# Patient Record
Sex: FEMALE | Race: WHITE | Employment: STUDENT | URBAN - METROPOLITAN AREA
[De-identification: names, ages, dates, MRNs, and addresses within clinical notes are randomized per-mention and may not be internally consistent; named-entity substitution may affect disease eponyms.]

---
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
## 2020-11-06 ENCOUNTER — OFFICE VISIT (OUTPATIENT)
Dept: FAMILY MEDICINE CLINIC | Facility: CLINIC | Age: 20
End: 2020-11-06
Payer: COMMERCIAL

## 2020-11-06 VITALS — HEART RATE: 101 BPM | OXYGEN SATURATION: 98 % | TEMPERATURE: 98 F

## 2020-11-06 DIAGNOSIS — Z11.59 SCREENING FOR VIRAL DISEASE: ICD-10-CM

## 2020-11-06 DIAGNOSIS — R05.9 COUGH: Primary | ICD-10-CM

## 2020-11-06 PROCEDURE — 99202 OFFICE O/P NEW SF 15 MIN: CPT | Performed by: FAMILY MEDICINE

## 2020-11-06 PROCEDURE — 99072 ADDL SUPL MATRL&STAF TM PHE: CPT | Performed by: FAMILY MEDICINE

## 2020-11-06 NOTE — PROGRESS NOTES
CHIEF COMPLAINT:   Patient presents with:  Cough: cough, congestion. wants covid testing. HPI:   Liz Shaikh is a 21year old female presents to clinic with complaints of cough, nasal congestion, fever, body aches.  Patient has had symptoms fo is non labored. CARDIO: RRR without murmur  EXTREMITIES: no cyanosis, clubbing or edema  LYMPH: no anterior cervical lymphadenopathy, no submandibular lymphadenopathy. No  posterior cervical or occipital lymphadenopathy.     No results found for this or a

## 2021-01-29 ENCOUNTER — TELEMEDICINE (OUTPATIENT)
Dept: TELEHEALTH | Age: 21
End: 2021-01-29

## 2021-01-29 ENCOUNTER — TELEPHONE (OUTPATIENT)
Dept: TELEHEALTH | Age: 21
End: 2021-01-29

## 2021-01-29 DIAGNOSIS — R39.9 UTI SYMPTOMS: Primary | ICD-10-CM

## 2021-01-29 DIAGNOSIS — Z02.9 ENCOUNTER FOR ADMINISTRATIVE EXAMINATIONS, UNSPECIFIED: Primary | ICD-10-CM

## 2021-01-29 PROCEDURE — 99212 OFFICE O/P EST SF 10 MIN: CPT | Performed by: NURSE PRACTITIONER

## 2021-01-29 RX ORDER — CEPHALEXIN 500 MG/1
500 CAPSULE ORAL 2 TIMES DAILY
Qty: 14 CAPSULE | Refills: 0 | Status: SHIPPED | OUTPATIENT
Start: 2021-01-29 | End: 2021-02-05

## 2021-01-29 NOTE — TELEPHONE ENCOUNTER
Virtual Telephone Check-In    Marc Baxter verbally consents to a Virtual/Telephone Check-In visit on 01/29/21. Patient has been referred to the [facility] website at [email or web address] to review the yearly Consent to Treat document.     Patient unders

## 2021-01-29 NOTE — PATIENT INSTRUCTIONS
· Complete full course of antibiotics. · We will call you in 2-3 days with the results of your urine culture.   · Over the counter Tylenol (acetaminophen) or Advil/Motrin (ibuprofen) can be taken according to package instructions as needed for pain/disco The terms bladder infection, UTI, and cystitis are often used to describe the same thing. But they are not always the same. Cystitis is an inflammation of the bladder. The most common cause of cystitis is an infection.   Symptoms  The infection causes infla · Take antibiotics until they are used up, even if you feel better. It's important to finish them to make sure the infection has cleared. · You can use acetaminophen or ibuprofen for pain, fever, or discomfort, unless another medicine was prescribed.  If y If a culture was done, you will be told if your treatment needs to be changed. If directed, you can call to find out the results. If X-rays were done, you will be told if the results will affect your treatment.   Call 911  Call 911 if any of the following What side effects may I notice from receiving this medicine?   Side effects that you should report to your doctor or health care professional as soon as possible:  · allergic reactions like skin rash, itching or hives, swelling of the face, lips, or tongue This medicine may cause serious skin reactions. They can happen weeks to months after starting the medicine. Contact your health care provider right away if you notice fevers or flu-like symptoms with a rash.  The rash may be red or purple and then turn int

## 2021-04-02 ENCOUNTER — OFFICE VISIT (OUTPATIENT)
Dept: INTERNAL MEDICINE CLINIC | Facility: CLINIC | Age: 21
End: 2021-04-02
Payer: COMMERCIAL

## 2021-04-02 VITALS
BODY MASS INDEX: 18.18 KG/M2 | RESPIRATION RATE: 14 BRPM | SYSTOLIC BLOOD PRESSURE: 98 MMHG | OXYGEN SATURATION: 97 % | TEMPERATURE: 98 F | WEIGHT: 107.81 LBS | HEIGHT: 64.75 IN | DIASTOLIC BLOOD PRESSURE: 66 MMHG | HEART RATE: 81 BPM

## 2021-04-02 DIAGNOSIS — N30.00 ACUTE CYSTITIS WITHOUT HEMATURIA: Primary | ICD-10-CM

## 2021-04-02 DIAGNOSIS — E55.9 VITAMIN D DEFICIENCY: ICD-10-CM

## 2021-04-02 DIAGNOSIS — Z00.00 PHYSICAL EXAM: ICD-10-CM

## 2021-04-02 DIAGNOSIS — Z13.29 SCREENING FOR THYROID DISORDER: ICD-10-CM

## 2021-04-02 DIAGNOSIS — Z72.0 TOBACCO ABUSE: ICD-10-CM

## 2021-04-02 DIAGNOSIS — D64.9 NORMOCYTIC ANEMIA: ICD-10-CM

## 2021-04-02 PROCEDURE — 3078F DIAST BP <80 MM HG: CPT | Performed by: NURSE PRACTITIONER

## 2021-04-02 PROCEDURE — 81003 URINALYSIS AUTO W/O SCOPE: CPT | Performed by: NURSE PRACTITIONER

## 2021-04-02 PROCEDURE — 99214 OFFICE O/P EST MOD 30 MIN: CPT | Performed by: NURSE PRACTITIONER

## 2021-04-02 PROCEDURE — 87086 URINE CULTURE/COLONY COUNT: CPT | Performed by: NURSE PRACTITIONER

## 2021-04-02 PROCEDURE — 3008F BODY MASS INDEX DOCD: CPT | Performed by: NURSE PRACTITIONER

## 2021-04-02 PROCEDURE — 3074F SYST BP LT 130 MM HG: CPT | Performed by: NURSE PRACTITIONER

## 2021-04-02 RX ORDER — SULFAMETHOXAZOLE AND TRIMETHOPRIM 800; 160 MG/1; MG/1
1 TABLET ORAL 2 TIMES DAILY
Qty: 14 TABLET | Refills: 0 | Status: SHIPPED | OUTPATIENT
Start: 2021-04-02 | End: 2021-04-09

## 2021-04-02 RX ORDER — CHOLECALCIFEROL (VITAMIN D3) 125 MCG
1 CAPSULE ORAL DAILY
COMMUNITY

## 2021-04-02 NOTE — PATIENT INSTRUCTIONS
Take antibiotic completely as ordered     Take antibiotic with food    Eat yogurt twice a day while on antibiotic or take a probiotic    Monitor for diarrhea and side effect.  Monitor for allergies    Follow up in one week for your annual physical and UTI s

## 2021-04-02 NOTE — PROGRESS NOTES
[facility]    CHIEF COMPLAINT:  Patient presents with:  Urinary Symptoms: Frequent urination, burning, back pn and feeling tired.   Started 3/29/21        HISTORY OF PRESENT ILLNESS:  The patient is a 21year old year old female new to this office ([address]     pt taking keppra        Past Surgical History:   Past Surgical History:   Procedure Laterality Date   • OTHER SURGICAL HISTORY      adnoids out 2007       Current Medications:    Current Outpatient Medications   Medication Sig Dispense Refill   • Transportation (Non-Medical):   Physical Activity:       Days of Exercise per Week:       Minutes of Exercise per Session:   Stress:       Feeling of Stress :   Social Connections:       Frequency of Communication with Friends and Family:       Frequency o Falls in the last 12 months: Yes    FUNCTIONAL ASSESSMENT (Able to perform all ADLs):  Yes    BODY HABITUS AND NUTRITION STATUS:  Body mass index is 18.08 kg/m².     DEPRESSION ASSESSMENT:  No    PHYSICAL EXAM:     BP 98/66 (BP Location: Right arm, Patien culture ordered. - due to hx of seizures pcn and cipro avoided. Bactrim ordered. Has tolerated it in the past. Will await sensitivity.    - see pt instructions for abt edu  - SPECIMEN HANDLING,DR OFF->LAB  - URINALYSIS, ROUTINE; Future  - URINE CULTURE, RO

## 2021-04-09 ENCOUNTER — OFFICE VISIT (OUTPATIENT)
Dept: INTERNAL MEDICINE CLINIC | Facility: CLINIC | Age: 21
End: 2021-04-09
Payer: COMMERCIAL

## 2021-04-09 VITALS
OXYGEN SATURATION: 98 % | TEMPERATURE: 98 F | HEIGHT: 65.75 IN | HEART RATE: 89 BPM | SYSTOLIC BLOOD PRESSURE: 96 MMHG | DIASTOLIC BLOOD PRESSURE: 72 MMHG | RESPIRATION RATE: 14 BRPM | WEIGHT: 107.19 LBS | BODY MASS INDEX: 17.43 KG/M2

## 2021-04-09 DIAGNOSIS — E55.9 VITAMIN D DEFICIENCY: ICD-10-CM

## 2021-04-09 DIAGNOSIS — Z72.0 TOBACCO ABUSE: ICD-10-CM

## 2021-04-09 DIAGNOSIS — D64.9 NORMOCYTIC ANEMIA: ICD-10-CM

## 2021-04-09 DIAGNOSIS — Z00.00 ENCOUNTER FOR ANNUAL HEALTH EXAMINATION: Primary | ICD-10-CM

## 2021-04-09 PROCEDURE — 3074F SYST BP LT 130 MM HG: CPT | Performed by: NURSE PRACTITIONER

## 2021-04-09 PROCEDURE — 3078F DIAST BP <80 MM HG: CPT | Performed by: NURSE PRACTITIONER

## 2021-04-09 PROCEDURE — 3008F BODY MASS INDEX DOCD: CPT | Performed by: NURSE PRACTITIONER

## 2021-04-09 PROCEDURE — 90471 IMMUNIZATION ADMIN: CPT | Performed by: NURSE PRACTITIONER

## 2021-04-09 PROCEDURE — 90651 9VHPV VACCINE 2/3 DOSE IM: CPT | Performed by: NURSE PRACTITIONER

## 2021-04-09 PROCEDURE — 99395 PREV VISIT EST AGE 18-39: CPT | Performed by: NURSE PRACTITIONER

## 2021-04-09 NOTE — PROGRESS NOTES
CHIEF COMPLAINT   Complete physical    HPI:   Corinne Economy is a 21year old female who presents for a complete physical exam.     Wt Readings from Last 6 Encounters:  04/09/21 : 107 lb 3.2 oz (48.6 kg)  04/02/21 : 107 lb 12.8 oz (48.9 kg)  11/25/19 : 1 [facility])     pt taking keppra   • Vitamin D deficiency       Past Surgical History:   Procedure Laterality Date   • ADENOIDECTOMY Bilateral 2007   • BOTOX Bilateral 10/2020    Lip injections   • OTHER SURGICAL HISTORY      adnoids out 2007      Family History headaches  PSYCHE: no complaint of depression or anxiety  HEMATOLOGIC: denies hx of anemia    EXAM:   BP 96/72 (BP Location: Right arm, Patient Position: Sitting, Cuff Size: adult)   Temp 98.1 °F (36.7 °C) (Temporal)   Resp 14   Ht 5' 5.75\" (1.67 m)   Wt Pap and pelvic deferred to gyne per patient request. Reviewed diet and exercise. Pt' s weight is Body mass index is 17.43 kg/m². Oneal Claude Recommended regular exercise. Vaccines discussed. 2nd HPV vaccine given. Labs to be done. See gyne. Stop smoking.      2. Vit

## 2021-04-09 NOTE — PATIENT INSTRUCTIONS
Get your labs done when your have time. You should be fasting for at least 10 hours. If you take a multivitamin with Biotin or any biotin product it should be held for 3 days prior to getting your labs done.     Get your last HPV vaccine in 12 weeks    See

## 2021-05-11 ENCOUNTER — TELEPHONE (OUTPATIENT)
Dept: INTERNAL MEDICINE CLINIC | Facility: CLINIC | Age: 21
End: 2021-05-11

## 2021-05-11 NOTE — TELEPHONE ENCOUNTER
LM to call back to triage more  Notified phones are up until 4.45 pm and would like to find out more about her symptoms to assess if she can wait until her appt tomorrow or need to go to ER

## 2021-05-11 NOTE — TELEPHONE ENCOUNTER
Called and spoke with the patient regarding her mychart appt request for Mold sickness and shortness of breath. Stated she recently discovered mold in her house/condo. Stated she has been having intermittent shortness of breath for the past month and camryn

## 2021-05-13 ENCOUNTER — OFFICE VISIT (OUTPATIENT)
Dept: INTERNAL MEDICINE CLINIC | Facility: CLINIC | Age: 21
End: 2021-05-13
Payer: COMMERCIAL

## 2021-05-13 VITALS
RESPIRATION RATE: 16 BRPM | HEART RATE: 105 BPM | TEMPERATURE: 99 F | HEIGHT: 64.75 IN | OXYGEN SATURATION: 98 % | WEIGHT: 110.19 LBS | SYSTOLIC BLOOD PRESSURE: 112 MMHG | DIASTOLIC BLOOD PRESSURE: 84 MMHG | BODY MASS INDEX: 18.58 KG/M2

## 2021-05-13 DIAGNOSIS — Z13.21 ENCOUNTER FOR VITAMIN DEFICIENCY SCREENING: ICD-10-CM

## 2021-05-13 DIAGNOSIS — R06.02 SHORTNESS OF BREATH: Primary | ICD-10-CM

## 2021-05-13 DIAGNOSIS — D64.9 NORMOCYTIC ANEMIA: ICD-10-CM

## 2021-05-13 DIAGNOSIS — R53.83 FATIGUE, UNSPECIFIED TYPE: ICD-10-CM

## 2021-05-13 PROCEDURE — 3079F DIAST BP 80-89 MM HG: CPT | Performed by: NURSE PRACTITIONER

## 2021-05-13 PROCEDURE — 99214 OFFICE O/P EST MOD 30 MIN: CPT | Performed by: NURSE PRACTITIONER

## 2021-05-13 PROCEDURE — 3074F SYST BP LT 130 MM HG: CPT | Performed by: NURSE PRACTITIONER

## 2021-05-13 PROCEDURE — 3008F BODY MASS INDEX DOCD: CPT | Performed by: NURSE PRACTITIONER

## 2021-05-13 RX ORDER — INHALER, ASSIST DEVICES
SPACER (EA) MISCELLANEOUS
Qty: 1 DEVICE | Refills: 0 | Status: SHIPPED | OUTPATIENT
Start: 2021-05-13

## 2021-05-13 RX ORDER — ALBUTEROL SULFATE 90 UG/1
2 AEROSOL, METERED RESPIRATORY (INHALATION) EVERY 6 HOURS PRN
Qty: 1 INHALER | Refills: 0 | Status: SHIPPED | OUTPATIENT
Start: 2021-05-13

## 2021-05-13 NOTE — PATIENT INSTRUCTIONS
Get your labs completed. You should be holding biotin for 4 days prior to your labs if you do take it. Make an appointment at the outpatient lab at the hospital site. Take the albuterol inhaler as needed for wheezing and shortness of breath.   Monitor

## 2021-05-13 NOTE — PROGRESS NOTES
Bia Leary is a 21year old female. CHIEF COMPLAINT   Shortness of breath, fatigue    HPI:   In January there was a roof leak. The closet got wet. She noticed two weeks ago that there was mildew on clothes from the closet.       Shortness of breath f Location: Right arm, Patient Position: Sitting, Cuff Size: adult)   Pulse 105   Temp 99.3 °F (37.4 °C) (Temporal)   Resp 16   Ht 5' 4.75\" (1.645 m)   Wt 110 lb 3.2 oz (50 kg)   LMP 05/01/2021   SpO2 98%   BMI 18.48 kg/m²   Body mass index is 18.48 kg/m². Future  - METHACHOLINE/ARIDOL CHALLENGE; Future  - D-DIMER; Future  - SARS-COV-2 BY PCR ([facility]); Future    2. Fatigue, unspecified type  - has hx of anemia. Iron studies ordered to be checked. CBC ordered. Vitamin levels ordered.      3. Normocytic anemia

## 2021-05-14 ENCOUNTER — LAB ENCOUNTER (OUTPATIENT)
Dept: LAB | Age: 21
End: 2021-05-14
Attending: NURSE PRACTITIONER
Payer: COMMERCIAL

## 2021-05-14 DIAGNOSIS — R06.02 SHORTNESS OF BREATH: ICD-10-CM

## 2021-05-14 DIAGNOSIS — D64.9 NORMOCYTIC ANEMIA: ICD-10-CM

## 2021-05-14 DIAGNOSIS — Z13.21 ENCOUNTER FOR VITAMIN DEFICIENCY SCREENING: ICD-10-CM

## 2021-05-14 DIAGNOSIS — Z13.29 SCREENING FOR THYROID DISORDER: ICD-10-CM

## 2021-05-14 DIAGNOSIS — Z00.00 PHYSICAL EXAM: ICD-10-CM

## 2021-05-14 DIAGNOSIS — E55.9 VITAMIN D DEFICIENCY: ICD-10-CM

## 2021-05-14 PROCEDURE — 85379 FIBRIN DEGRADATION QUANT: CPT | Performed by: NURSE PRACTITIONER

## 2021-05-14 PROCEDURE — 82607 VITAMIN B-12: CPT | Performed by: NURSE PRACTITIONER

## 2021-05-14 PROCEDURE — 83550 IRON BINDING TEST: CPT | Performed by: NURSE PRACTITIONER

## 2021-05-14 PROCEDURE — 80050 GENERAL HEALTH PANEL: CPT | Performed by: NURSE PRACTITIONER

## 2021-05-14 PROCEDURE — 82306 VITAMIN D 25 HYDROXY: CPT | Performed by: NURSE PRACTITIONER

## 2021-05-14 PROCEDURE — 83540 ASSAY OF IRON: CPT | Performed by: NURSE PRACTITIONER

## 2021-05-14 PROCEDURE — 82728 ASSAY OF FERRITIN: CPT | Performed by: NURSE PRACTITIONER

## 2021-07-12 ENCOUNTER — TELEPHONE (OUTPATIENT)
Dept: INTERNAL MEDICINE CLINIC | Facility: CLINIC | Age: 21
End: 2021-07-12

## 2021-07-12 NOTE — TELEPHONE ENCOUNTER
Pt made video visit for poss UTI on my chart. Is this appropriate or does pt need to come in to the office.  Thank you

## 2021-07-12 NOTE — TELEPHONE ENCOUNTER
Spoke to pt  Recommended to come in for evaluation for UTI  Pt v/u  VV appt changed to OV, ok per [facility]

## 2022-01-28 ENCOUNTER — TELEMEDICINE (OUTPATIENT)
Dept: TELEHEALTH | Age: 22
End: 2022-01-28

## 2022-01-28 ENCOUNTER — HOSPITAL ENCOUNTER (OUTPATIENT)
Age: 22
Discharge: HOME | End: 2022-01-28
Payer: COMMERCIAL

## 2022-01-28 VITALS
BODY MASS INDEX: 17.49 KG/M2 | HEART RATE: 74 BPM | DIASTOLIC BLOOD PRESSURE: 82 MMHG | SYSTOLIC BLOOD PRESSURE: 142 MMHG | TEMPERATURE: 98 F | HEIGHT: 65 IN | WEIGHT: 105 LBS | RESPIRATION RATE: 18 BRPM | OXYGEN SATURATION: 100 %

## 2022-01-28 DIAGNOSIS — J35.8 TONSILLAR EXUDATE: ICD-10-CM

## 2022-01-28 DIAGNOSIS — Z02.9 ENCOUNTERS FOR ADMINISTRATIVE PURPOSES: Primary | ICD-10-CM

## 2022-01-28 DIAGNOSIS — J03.90 TONSILLITIS: Primary | ICD-10-CM

## 2022-01-28 LAB
POCT MONO: NEGATIVE
S PYO AG THROAT QL: NEGATIVE

## 2022-01-28 PROCEDURE — 87880 STREP A ASSAY W/OPTIC: CPT | Performed by: NURSE PRACTITIONER

## 2022-01-28 PROCEDURE — 86308 HETEROPHILE ANTIBODY SCREEN: CPT | Performed by: NURSE PRACTITIONER

## 2022-01-28 PROCEDURE — 99213 OFFICE O/P EST LOW 20 MIN: CPT | Performed by: NURSE PRACTITIONER

## 2022-01-28 RX ORDER — AMOXICILLIN AND CLAVULANATE POTASSIUM 875; 125 MG/1; MG/1
1 TABLET, FILM COATED ORAL 2 TIMES DAILY
Qty: 20 TABLET | Refills: 0 | Status: SHIPPED | OUTPATIENT
Start: 2022-01-28 | End: 2022-02-07

## 2022-01-28 NOTE — ED INITIAL ASSESSMENT (HPI)
Denies pain, just feels like back of throat/tonsils irritated after drinking coconut water few days ago.

## 2022-01-28 NOTE — ED PROVIDER NOTES
CHIEF COMPLAINT:   Patient presents with:  Sore Throat      HPI:   Shea Rivers is a 24year old female presents to clinic with symptoms of sore throat. Patient has had for 4 days. Symptoms have been persisting and worsening since onset.   Patient repor use: No       REVIEW OF SYSTEMS:   GENERAL HEALTH:  See HPI  SKIN: see HPI  HEENT: denies ear pain, See HPI  RESPIRATORY: denies shortness of breath, or wheezing  CARDIOVASCULAR: denies chest pain, palpitations   GI: denies abdominal pain, constipation and Sig: Take 1 tablet by mouth 2 (two) times daily for 10 days. Risks, benefits, complications and side effects of meds discussed with patient. OTC Tylenol/Motrin prn.    Push fluids- warm or cool liquids, whichever is soothing for patient  If treate

## 2022-01-28 NOTE — PROGRESS NOTES
Telehealth Verbal Consent   I conducted a telehealth visit with Kaila Bundy today, 01/28/22, which was completed using two-way, real-time interactive audio and video communication.  This has been done in good gayatri to provide continuity of care by mouth daily. Take 1/2 a tablet daily     • CRANBERRY OR Take 2 capsules by mouth daily.      • Albuterol Sulfate HFA (PROAIR HFA) 108 (90 Base) MCG/ACT Inhalation Aero Soln Inhale 2 puffs into the lungs every 6 (six) hours as needed for Wheezing or Short encounter diagnosis)    PLAN: Meds as below. See patient Instructions. -patient advised to go to the [address] [place] [address] for testing. Patient given address and information. Patient is agreeable to plan.      Meds & Refills for this Visit:  Requested Prescriptions      N

## 2023-06-13 ENCOUNTER — OFFICE VISIT (OUTPATIENT)
Facility: CLINIC | Age: 23
End: 2023-06-13
Payer: COMMERCIAL

## 2023-06-13 VITALS
SYSTOLIC BLOOD PRESSURE: 102 MMHG | WEIGHT: 115 LBS | DIASTOLIC BLOOD PRESSURE: 62 MMHG | HEART RATE: 67 BPM | BODY MASS INDEX: 19 KG/M2

## 2023-06-13 DIAGNOSIS — N94.3 PREMENSTRUAL SYNDROME: Primary | ICD-10-CM

## 2023-06-13 DIAGNOSIS — R45.4 IRRITABLE BEHAVIOR: ICD-10-CM

## 2023-06-13 DIAGNOSIS — Z86.39 H/O VITAMIN D DEFICIENCY: ICD-10-CM

## 2023-06-13 PROCEDURE — 3078F DIAST BP <80 MM HG: CPT

## 2023-06-13 PROCEDURE — 99213 OFFICE O/P EST LOW 20 MIN: CPT

## 2023-06-13 PROCEDURE — 3074F SYST BP LT 130 MM HG: CPT

## 2023-12-19 NOTE — ED INITIAL ASSESSMENT (HPI)
Patient requesting MyC answer be sent on to PCP. Please see patient's MyC.    Patient states that she has taken Bactrim before.   Patient also requesting an oral yeast treatment (patient states she has previously taken fluconazole), as she tends to get yeast infections with when taking antibiotics.  Patient is requesting a topical yeast treatment as well.  Patient is requesting a refill of Methylpredisonlone    Routing to PCP to review MyC and advise on patient requests.    Callback if needed: [phone]    Kathleen Spring RN  -[facility]   The patient states she woke up with a sore throat this morning and it has progressively gotten worse. Since around 1-2pm this afternoon she noticed it was difficult for her to swallow and she has been using a cup to spit into because she can't swallow.   Corina Valera

## 2024-04-26 ENCOUNTER — LAB ENCOUNTER (OUTPATIENT)
Dept: LAB | Age: 24
End: 2024-04-26
Attending: DERMATOLOGY
Payer: COMMERCIAL

## 2024-04-26 ENCOUNTER — OFFICE VISIT (OUTPATIENT)
Dept: OBGYN CLINIC | Facility: CLINIC | Age: 24
End: 2024-04-26
Payer: COMMERCIAL

## 2024-04-26 VITALS
HEIGHT: 65 IN | WEIGHT: 119.06 LBS | SYSTOLIC BLOOD PRESSURE: 124 MMHG | HEART RATE: 74 BPM | DIASTOLIC BLOOD PRESSURE: 80 MMHG | BODY MASS INDEX: 19.83 KG/M2

## 2024-04-26 DIAGNOSIS — Z86.39 H/O VITAMIN D DEFICIENCY: ICD-10-CM

## 2024-04-26 DIAGNOSIS — L63.9 ALOPECIA AREATA: ICD-10-CM

## 2024-04-26 DIAGNOSIS — R45.4 IRRITABLE BEHAVIOR: ICD-10-CM

## 2024-04-26 DIAGNOSIS — Z12.4 CERVICAL CANCER SCREENING: ICD-10-CM

## 2024-04-26 DIAGNOSIS — N94.3 PREMENSTRUAL SYNDROME: ICD-10-CM

## 2024-04-26 DIAGNOSIS — Z11.3 SCREENING EXAMINATION FOR STI: ICD-10-CM

## 2024-04-26 DIAGNOSIS — Z01.419 ENCOUNTER FOR WELL WOMAN EXAM WITH ROUTINE GYNECOLOGICAL EXAM: Primary | ICD-10-CM

## 2024-04-26 LAB
DEPRECATED HBV CORE AB SER IA-ACNC: 50 NG/ML
TSI SER-ACNC: 1.11 MIU/ML (ref 0.36–3.74)
VIT D+METAB SERPL-MCNC: 28.9 NG/ML (ref 30–100)

## 2024-04-26 PROCEDURE — 82728 ASSAY OF FERRITIN: CPT

## 2024-04-26 PROCEDURE — 87591 N.GONORRHOEAE DNA AMP PROB: CPT

## 2024-04-26 PROCEDURE — 82306 VITAMIN D 25 HYDROXY: CPT

## 2024-04-26 PROCEDURE — 83001 ASSAY OF GONADOTROPIN (FSH): CPT

## 2024-04-26 PROCEDURE — 3008F BODY MASS INDEX DOCD: CPT

## 2024-04-26 PROCEDURE — 83002 ASSAY OF GONADOTROPIN (LH): CPT

## 2024-04-26 PROCEDURE — 99395 PREV VISIT EST AGE 18-39: CPT

## 2024-04-26 PROCEDURE — 3074F SYST BP LT 130 MM HG: CPT

## 2024-04-26 PROCEDURE — 84443 ASSAY THYROID STIM HORMONE: CPT

## 2024-04-26 PROCEDURE — 87491 CHLMYD TRACH DNA AMP PROBE: CPT

## 2024-04-26 PROCEDURE — 3079F DIAST BP 80-89 MM HG: CPT

## 2024-04-26 PROCEDURE — 84146 ASSAY OF PROLACTIN: CPT

## 2024-04-26 RX ORDER — MULTIVIT-MIN/IRON/FOLIC ACID/K 18-600-40
CAPSULE ORAL
COMMUNITY

## 2024-04-26 RX ORDER — MULTIVITAMIN WITH IRON
50 TABLET ORAL DAILY
COMMUNITY

## 2024-04-27 LAB
FSH SERPL-ACNC: 6.4 MIU/ML
LH SERPL-ACNC: 18.8 MIU/ML
PROLACTIN SERPL-MCNC: 9 NG/ML

## 2024-04-28 DIAGNOSIS — E55.9 VITAMIN D INSUFFICIENCY: Primary | ICD-10-CM

## 2024-04-28 RX ORDER — ERGOCALCIFEROL 1.25 MG/1
50000 CAPSULE ORAL WEEKLY
Qty: 8 CAPSULE | Refills: 0 | Status: SHIPPED | OUTPATIENT
Start: 2024-04-28 | End: 2024-06-17

## 2024-04-29 LAB
C TRACH DNA SPEC QL NAA+PROBE: NEGATIVE
N GONORRHOEA DNA SPEC QL NAA+PROBE: NEGATIVE

## 2024-05-02 ENCOUNTER — OFFICE VISIT (OUTPATIENT)
Dept: INTERNAL MEDICINE CLINIC | Facility: CLINIC | Age: 24
End: 2024-05-02
Payer: COMMERCIAL

## 2024-05-02 VITALS
HEART RATE: 85 BPM | OXYGEN SATURATION: 98 % | TEMPERATURE: 98 F | RESPIRATION RATE: 17 BRPM | HEIGHT: 64.8 IN | WEIGHT: 115 LBS | DIASTOLIC BLOOD PRESSURE: 60 MMHG | SYSTOLIC BLOOD PRESSURE: 110 MMHG | BODY MASS INDEX: 19.16 KG/M2

## 2024-05-02 DIAGNOSIS — E55.9 VITAMIN D DEFICIENCY: ICD-10-CM

## 2024-05-02 DIAGNOSIS — Z13.0 SCREENING FOR DEFICIENCY ANEMIA: ICD-10-CM

## 2024-05-02 DIAGNOSIS — G40.009 BENIGN ROLANDIC EPILEPSY (HCC): ICD-10-CM

## 2024-05-02 DIAGNOSIS — Z98.890 H/O COSMETIC SURGERY: ICD-10-CM

## 2024-05-02 DIAGNOSIS — Z00.00 PHYSICAL EXAM, ANNUAL: Primary | ICD-10-CM

## 2024-05-02 DIAGNOSIS — Z13.228 SCREENING FOR ENDOCRINE, METABOLIC AND IMMUNITY DISORDER: ICD-10-CM

## 2024-05-02 DIAGNOSIS — Z86.2 HISTORY OF IRON DEFICIENCY ANEMIA: ICD-10-CM

## 2024-05-02 DIAGNOSIS — Z13.29 SCREENING FOR ENDOCRINE, METABOLIC AND IMMUNITY DISORDER: ICD-10-CM

## 2024-05-02 DIAGNOSIS — L63.9 ALOPECIA AREATA: ICD-10-CM

## 2024-05-02 DIAGNOSIS — Z13.0 SCREENING FOR ENDOCRINE, METABOLIC AND IMMUNITY DISORDER: ICD-10-CM

## 2024-05-02 DIAGNOSIS — Z72.0 TOBACCO ABUSE: ICD-10-CM

## 2024-05-02 DIAGNOSIS — Z87.891 FORMER SMOKER: ICD-10-CM

## 2024-05-02 DIAGNOSIS — Z13.220 SCREENING FOR LIPID DISORDERS: ICD-10-CM

## 2024-05-02 PROBLEM — D64.9 NORMOCYTIC ANEMIA: Status: RESOLVED | Noted: 2021-04-02 | Resolved: 2024-05-02

## 2024-05-02 PROCEDURE — 3078F DIAST BP <80 MM HG: CPT | Performed by: STUDENT IN AN ORGANIZED HEALTH CARE EDUCATION/TRAINING PROGRAM

## 2024-05-02 PROCEDURE — 3074F SYST BP LT 130 MM HG: CPT | Performed by: STUDENT IN AN ORGANIZED HEALTH CARE EDUCATION/TRAINING PROGRAM

## 2024-05-02 PROCEDURE — 99395 PREV VISIT EST AGE 18-39: CPT | Performed by: STUDENT IN AN ORGANIZED HEALTH CARE EDUCATION/TRAINING PROGRAM

## 2024-05-02 PROCEDURE — 3008F BODY MASS INDEX DOCD: CPT | Performed by: STUDENT IN AN ORGANIZED HEALTH CARE EDUCATION/TRAINING PROGRAM

## 2024-05-02 NOTE — PROGRESS NOTES
CHIEF COMPLAINT:   Chief Complaint   Patient presents with    Routine Physical     Sees Gyne Last Pap 4/25/24       HPI , Assessment & Plan:   Brooklynn Zheng is a 23 year old female who presents for a complete physical exam.    Wt Readings from Last 6 Encounters:   05/02/24 115 lb (52.2 kg)   04/26/24 119 lb 0.8 oz (54 kg)   06/13/23 115 lb 0.4 oz (52.2 kg)   01/28/22 105 lb (47.6 kg)   05/13/21 110 lb 3.2 oz (50 kg)   04/09/21 107 lb 3.2 oz (48.6 kg)     Body mass index is 19.26 kg/m².     //Lip fillers, dissolved   Recently dissolved. Concerned that she has retention. Does not feel any right now, but feels like her lower lip might be a bit lopsided.   PLAN:   Refer to cosmetic physician. Per discussion with radiology, unclear what imaging would be best to evaluate for her concern of retained lip filler.     //Benign rolandic epilepsy   No longer having symptoms. Took Keppra for 3 years. Stopped taking the medications at 13yo. Last seizure likely 2011 prior to starting keppra. In total reporting 50-70 episodes starting at 7 years old.   PLAN:   No longer having symptom. Does not see neurologist. CTM.     //Anxiety   18yo was started on medications by psychiatrist - adeamenaal, anxiety medicatons, sleeping medicaton, mood stabilizer. Does not feel that she needed all this. Has seen a psychologist and confirmed that she does not have ADHD. Now on no medications. Now only seeing therapist every 2 weeks and feels like her symptoms are well controlled. Mother with extensive mental health history and passed away from suicide. Patient reports that after her mother passed away, she has had a lot more stability in her life and sees her step mother in a maternal figure. Also reports that with the help of her dad and step mom, she has had a good experience with recovery. She reports that right now she is dealing with some situational anxiety with her current boyfriend as he has moved away and she does not want to. Some of  these thoughts make it difficult for her to sleep at night.   PLAN:   YURIY 10, PHQ9 - 3. Continue therapy. Continue to monitor. Does not need medication at this time as not interfering with her daily routine.     //Vitamin D deficiency   Prescribed [id number] weekly x 8.   PLAN:   Recommend OTC vitamin D 5000IU daily. Recheck in 8 weeks. If vitamin D deficiency not resolved at that time, will recommend high dose vitamin D.     //Hx of iron deficiency anemia  PLAN:   Ferritin wnls. CBC ordered.     //Former smoker  No longer smoking or vaping.   PLAN:   Continue to monitor.     //Alopecia Aerata  Follows with dermatology, Dr. Rice. Reports it has resolved now.   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    HEALTH MAINTENANCE:   -Vaccinations: Flu not in season, COVID DUe, HPV vaccine due, TdAP due  -Colonoscopy: - Not indicated  -Mammogram: - Not indicated  -Pap Smear: 04/26/2024, due in 2027  -Diabetes screening: Last A1c value was  % done  .  -Lipid screening: No Lipid panel on file.   -Birth Control: None  -Smoking: Vaping to smoking Former 4 years, jewel pods  -Alcohol: Rarely   -Marijuana: None  -Recreational drug: None    Return in about 1 year (around 5/2/2025) for Annual physical.    Rox Lubin MD   Internal Medicine     Current Outpatient Medications   Medication Sig Dispense Refill    ergocalciferol 1.25 MG (15027 UT) Oral Cap Take 1 capsule (50,000 Units total) by mouth once a week for 8 doses. 8 capsule 0    vitamin B-12 50 MCG Oral Tab Take 1 tablet (50 mcg total) by mouth daily.      ASHWAGANDHA 35 OR Take by mouth.      IRON-FOLIC ACID OR Take 1 tablet by mouth daily. Take 1/2 a tablet daily        Past Medical History:    Anemia    Anxiety    Benign rolandic epilepsy (HCC)    Depression    Dysthymia    Normocytic anemia    Seizure disorder (HCC)    pt taking keppra    Vitamin D deficiency      Past Surgical  History:   Procedure Laterality Date    Adenoidectomy Bilateral 2007    Botox Bilateral 10/2020    Lip injections    Other surgical history      adnoids out 2007      Family History   Problem Relation Age of Onset    Depression Mother     Anxiety Mother     PTSD Mother     Bipolar Disorder Mother     Suicide History Mother     High Blood Pressure Father     High Cholesterol Father     Alcohol and Other Disorders Associated Father     Hypertension Father     Other (throat cancer) Father     Heart Attack Father         s/p stent    Other (autism) Brother     Learning Disability Brother         Aspergers    Depression Brother     Alcohol and Other Disorders Associated Brother     Arthritis Maternal Grandmother     High Blood Pressure Maternal Grandmother     Other (Other) Maternal Grandmother     Arthritis Maternal Grandfather     Heart Disease Maternal Grandfather     High Blood Pressure Maternal Grandfather     Kidney Disease Maternal Grandfather     Cancer Maternal Grandfather     Heart Attack Maternal Grandfather       Social History:   Social History     Socioeconomic History    Marital status: Single   Tobacco Use    Smoking status: Former     Types: Cigarettes     Passive exposure: Past    Smokeless tobacco: Never    Tobacco comments:     2 cigs per wk   Vaping Use    Vaping status: Never Used   Substance and Sexual Activity    Alcohol use: No    Drug use: No   Other Topics Concern    Caffeine Concern Yes    Exercise Yes    Seat Belt Yes    Special Diet No    Stress Concern Yes    Weight Concern No     Occ: Business. : No. Children: No.        REVIEW OF SYSTEMS:   Negative except for what is mentioned in HPI.     Screenings:   1. Little interest or pleasure in doing things: Not at all  2. Feeling down, depressed, or hopeless: Not at all  3. Trouble falling or staying asleep, or sleeping too much: Several days  4. Feeling tired or having little energy: More than half the days  5. Poor appetite or  overeating: Not at all  6. Feeling bad about yourself - or that you are a failure or have let yourself or your family down: Not at all  7. Trouble concentrating on things, such as reading the newspaper or watching television: Not at all  8. Moving or speaking so slowly that other people could have noticed. Or the opposite - being so fidgety or restless that you have been moving around a lot more than usual: Not at all  9. Thoughts that you would be better off dead, or of hurting yourself in some way: Not at all  PHQ-9 TOTAL SCORE: 3  If you checked off any problems, how difficult have these problems made it for you to do your work, take care of things at home, or get along with other people?: Not difficult at all       YURIY: 10    EXAM:   /60 (BP Location: Right arm, Patient Position: Sitting, Cuff Size: adult)   Pulse 85   Temp 98.4 °F (36.9 °C)   Resp 17   Ht 5' 4.8\" (1.646 m)   Wt 115 lb (52.2 kg)   LMP 04/12/2024 (Exact Date)   SpO2 98%   BMI 19.26 kg/m²   Body mass index is 19.26 kg/m².   GENERAL: well developed, well nourished,in no apparent distress  SKIN: no rashes,no suspicious lesions. Overlying skin above L buttock with hyperpigmented lesion (birthmark)  HEENT: atraumatic, normocephalic,ears and throat are clear  EYES:PERRLA, conjunctiva are clear  NECK: supple,no adenopathy,no bruits  LUNGS: clear to auscultation  CARDIO: nl s1 and s2, RRR without murmur  GI: good BS's,no masses, HSM or tenderness  BREAST: no dominant or suspicious mass, no nipple discharge  MUSCULOSKELETAL: back is not tender,FROM of the back  EXTREMITIES: no cyanosis, clubbing or edema  NEURO: Oriented times three,cranial nerves are intact,motor and sensory are grossly intact      Labs:   Lab Results   Component Value Date/Time    WBC 6.8 05/14/2021 11:40 AM    HGB 13.5 05/14/2021 11:40 AM    .0 05/14/2021 11:40 AM      Lab Results   Component Value Date/Time    GLU 78 05/14/2021 11:40 AM     05/14/2021 11:40  AM    K 3.7 05/14/2021 11:40 AM     05/14/2021 11:40 AM    CO2 26.0 05/14/2021 11:40 AM    CREATSERUM 0.75 05/14/2021 11:40 AM    CA 9.1 05/14/2021 11:40 AM    ALB 4.1 05/14/2021 11:40 AM    TP 7.6 05/14/2021 11:40 AM    ALKPHO 50 (L) 05/14/2021 11:40 AM    AST 14 (L) 05/14/2021 11:40 AM    ALT 21 05/14/2021 11:40 AM    BILT 0.8 05/14/2021 11:40 AM    TSH 1.110 04/26/2024 12:44 PM        No results found for: \"CHOLEST\", \"HDL\", \"TRIG\", \"LDL\", \"NONHDLC\"    No results found for: \"A1C\"     Lab Results   Component Value Date    VITD 28.9 (L) 04/26/2024         Imaging:   No results found.

## 2024-05-06 ENCOUNTER — TELEPHONE (OUTPATIENT)
Facility: CLINIC | Age: 24
End: 2024-05-06

## 2024-05-06 LAB
.: NORMAL
.: NORMAL

## 2024-08-22 ENCOUNTER — OFFICE VISIT (OUTPATIENT)
Dept: INTERNAL MEDICINE CLINIC | Facility: CLINIC | Age: 24
End: 2024-08-22
Payer: COMMERCIAL

## 2024-08-22 VITALS
BODY MASS INDEX: 18.66 KG/M2 | TEMPERATURE: 98 F | SYSTOLIC BLOOD PRESSURE: 110 MMHG | DIASTOLIC BLOOD PRESSURE: 66 MMHG | OXYGEN SATURATION: 98 % | RESPIRATION RATE: 20 BRPM | WEIGHT: 112 LBS | HEIGHT: 64.8 IN | HEART RATE: 68 BPM

## 2024-08-22 DIAGNOSIS — Z12.4 SCREENING FOR MALIGNANT NEOPLASM OF CERVIX: ICD-10-CM

## 2024-08-22 DIAGNOSIS — N93.9 ABNORMAL UTERINE BLEEDING (AUB): ICD-10-CM

## 2024-08-22 DIAGNOSIS — N89.8 VAGINAL ODOR: Primary | ICD-10-CM

## 2024-08-22 DIAGNOSIS — F41.9 ANXIETY: ICD-10-CM

## 2024-08-22 PROCEDURE — 87591 N.GONORRHOEAE DNA AMP PROB: CPT | Performed by: STUDENT IN AN ORGANIZED HEALTH CARE EDUCATION/TRAINING PROGRAM

## 2024-08-22 PROCEDURE — 3078F DIAST BP <80 MM HG: CPT | Performed by: STUDENT IN AN ORGANIZED HEALTH CARE EDUCATION/TRAINING PROGRAM

## 2024-08-22 PROCEDURE — 87491 CHLMYD TRACH DNA AMP PROBE: CPT | Performed by: STUDENT IN AN ORGANIZED HEALTH CARE EDUCATION/TRAINING PROGRAM

## 2024-08-22 PROCEDURE — 87661 TRICHOMONAS VAGINALIS AMPLIF: CPT | Performed by: STUDENT IN AN ORGANIZED HEALTH CARE EDUCATION/TRAINING PROGRAM

## 2024-08-22 PROCEDURE — 3008F BODY MASS INDEX DOCD: CPT | Performed by: STUDENT IN AN ORGANIZED HEALTH CARE EDUCATION/TRAINING PROGRAM

## 2024-08-22 PROCEDURE — 3074F SYST BP LT 130 MM HG: CPT | Performed by: STUDENT IN AN ORGANIZED HEALTH CARE EDUCATION/TRAINING PROGRAM

## 2024-08-22 PROCEDURE — 81514 NFCT DS BV&VAGINITIS DNA ALG: CPT | Performed by: STUDENT IN AN ORGANIZED HEALTH CARE EDUCATION/TRAINING PROGRAM

## 2024-08-22 PROCEDURE — 99214 OFFICE O/P EST MOD 30 MIN: CPT | Performed by: STUDENT IN AN ORGANIZED HEALTH CARE EDUCATION/TRAINING PROGRAM

## 2024-08-22 NOTE — PROGRESS NOTES
CHIEF COMPLAINT:   Chief Complaint   Patient presents with    Menstrual Problem     Was seen in the urgent care 8/20 worried about  monthly cycle coming on early stared 11 days before her regular started time.  want test for STD,        HPI , Assessment & Plan:   Brooklynn Zheng is a 24 year old female who presents for menstruation issues.     Wt Readings from Last 6 Encounters:   08/22/24 112 lb (50.8 kg)   05/02/24 115 lb (52.2 kg)   04/26/24 119 lb 0.8 oz (54 kg)   06/13/23 115 lb 0.4 oz (52.2 kg)   01/28/22 105 lb (47.6 kg)   05/13/21 110 lb 3.2 oz (50 kg)     Body mass index is 18.75 kg/m².     //AUB  Menstrual cycle came 11 days early. Has a new partner. Wants to be tested for STDs. Went to Urgent Care recently, chlamydia and gonorrhea was tested but inconclusive. Pregnancy testing was negative. Reporting high levels of stress this month. Reporting food poisoning x3 times. Quit smoking on Sunday. Drinking a lot more than usual. Has a very stressful job. August 1-4th period, August 18th-Today also has a period. Quit smoking on Sunday. She is reporting she is drinking alcohol every other day. A couple glasses of wine. Reports 10 shots over the weekend.   PLAN:   Her early period is likely a manifestation of stress and lifestyle. This is her first early period. Recommend major lifestyle changes at this time and to reevaluate in 3 months.   -Recommend continued monitoring of periods. If continues to come early, then would recommend evaluation by gyn and potentially vaginal ultrasound.   -Recommend lifestyle change by cutting out alcohol completely, continue to quit smoking, and exercise and dietary changes.   -Recommend reducing stressors as able.     //Vaginal odor  New sexual partner. Reporting HIV and syphillis pending.   PLAN:   -Vaginitis panel   -Trichomonas ordered  -Chlamydia and gonorrhea ordered.     //Anxiety   16yo was started on medications by psychiatrist - aderral, anxiety medicatons, sleeping  medicaton, mood stabilizer. Does not feel that she needed all this. Has seen a psychologist and confirmed that she does not have ADHD. Now only seeing therapist every 2 weeks and feels like her symptoms are well controlled. Mother with extensive mental health history and passed away from suicide. Patient reports that after her mother passed away, she has had a lot more stability in her life and sees her step mother in a maternal figure. Also reports that with the help of her dad and step mom, she has had a good experience with recovery. She reports that right now she is dealing with some situational anxiety with her current boyfriend as he has moved away and she does not want to. Some of these thoughts make it difficult for her to sleep at night.   PLAN:   PHQ9 - 20. SI screening negative. Continue therapy. Continue to monitor. She feels her mental health is situational and she feels that the current stressors will be removed soon and she will feel better. I did bring up medication with her to consider if symptoms progress. We did not discuss a specific medication or side effects.   MedHx: adderall    PROBLEMS NOT DISCUSSED:   //Lip fillers, dissolved   Recently dissolved. Concerned that she has retention. Does not feel any right now, but feels like her lower lip might be a bit lopsided.   PLAN:   Refer to cosmetic physician. Per discussion with radiology, unclear what imaging would be best to evaluate for her concern of retained lip filler.     //Benign rolandic epilepsy   No longer having symptoms. Took Keppra for 3 years. Stopped taking the medications at 15yo. Last seizure likely 2011 prior to starting keppra. In total reporting 50-70 episodes starting at 7 years old.   PLAN:   No longer having symptom. Does not see neurologist. CTM.     //Vitamin D deficiency   Prescribed [id number] weekly x 8.   PLAN:   Recommend OTC vitamin D 5000IU daily. Recheck in 8 weeks. If vitamin D deficiency not resolved at that time,  will recommend high dose vitamin D.     //Hx of iron deficiency anemia  PLAN:   Ferritin wnls. CBC ordered.     //Former smoker  No longer smoking or vaping.   PLAN:   Continue to monitor.     //Alopecia Aerata  Follows with dermatology, Dr. Rice. Reports it has resolved now.   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    HEALTH MAINTENANCE:   -Vaccinations: Flu not in season, COVID DUe, HPV vaccine due, TdAP due  -Colonoscopy: - Not indicated  -Mammogram: - Not indicated  -Pap Smear: 04/26/2024 was inconclusive in April, repeat testing done today.   -Diabetes screening: Last A1c value was  % done  .  -Lipid screening: No Lipid panel on file.   -Birth Control: None  -Smoking: Vaping to smoking Former 4 years, jewel pods  -Alcohol: Rarely   -Marijuana: None  -Recreational drug: None    Return in about 3 months (around 11/22/2024) for Follow-up.    Rox Lubin MD   Internal Medicine     Current Outpatient Medications   Medication Sig Dispense Refill    vitamin B-12 50 MCG Oral Tab Take 1 tablet (50 mcg total) by mouth daily.      ASHWAGANDHA 35 OR Take by mouth.      IRON-FOLIC ACID OR Take 1 tablet by mouth daily. Take 1/2 a tablet daily        Past Medical History:    Anemia    Anxiety    Benign rolandic epilepsy (HCC)    Depression    Dysthymia    Normocytic anemia    Seizure disorder (HCC)    pt taking keppra    Vitamin D deficiency      Past Surgical History:   Procedure Laterality Date    Adenoidectomy Bilateral 2007    Botox Bilateral 10/2020    Lip injections    Other surgical history      adnoids out 2007      Family History   Problem Relation Age of Onset    Depression Mother     Anxiety Mother     PTSD Mother     Bipolar Disorder Mother     Suicide History Mother     High Blood Pressure Father     High Cholesterol Father     Alcohol and Other Disorders Associated Father     Hypertension Father     Other (throat  cancer) Father     Heart Attack Father         s/p stent    Other (autism) Brother     Learning Disability Brother         Aspergers    Depression Brother     Alcohol and Other Disorders Associated Brother     Arthritis Maternal Grandmother     High Blood Pressure Maternal Grandmother     Other (Other) Maternal Grandmother     Arthritis Maternal Grandfather     Heart Disease Maternal Grandfather     High Blood Pressure Maternal Grandfather     Kidney Disease Maternal Grandfather     Cancer Maternal Grandfather     Heart Attack Maternal Grandfather       Social History:   Social History     Socioeconomic History    Marital status: Single   Tobacco Use    Smoking status: Former     Types: Cigarettes     Passive exposure: Past    Smokeless tobacco: Never    Tobacco comments:     2 cigs per wk   Vaping Use    Vaping status: Never Used   Substance and Sexual Activity    Alcohol use: No    Drug use: No   Other Topics Concern    Caffeine Concern Yes    Exercise Yes    Seat Belt Yes    Special Diet No    Stress Concern Yes    Weight Concern No   Social History Narrative    Works 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     Occ: Business. : No. Children: No.        REVIEW OF SYSTEMS:   Negative except for what is mentioned in HPI.     Screenings:   1. Little interest or pleasure in doing things: Nearly every day  2. Feeling down, depressed, or hopeless: Nearly every day  3. Trouble falling or staying asleep, or sleeping too much: Nearly every day  4. Feeling tired or having little energy: Nearly every day  5. Poor appetite or overeating: Nearly every day  6. Feeling bad about yourself - or that you are a failure or have let yourself or your family down: Several days  7. Trouble concentrating on things, such as reading the newspaper or watching television: Several days  8. Moving or speaking so slowly that other people could have noticed. Or the  opposite - being so fidgety or restless that you have been moving around a lot more than usual: Not at all  9. Thoughts that you would be better off dead, or of hurting yourself in some way: Nearly every day  PHQ-9 TOTAL SCORE: 20  If you checked off any problems, how difficult have these problems made it for you to do your work, take care of things at home, or get along with other people?: Very difficult     EXAM:   /66 (BP Location: Right arm, Patient Position: Sitting, Cuff Size: adult)   Pulse 68   Temp 98 °F (36.7 °C)   Resp 20   Ht 5' 4.8\" (1.646 m)   Wt 112 lb (50.8 kg)   LMP 04/12/2024 (Exact Date)   SpO2 98%   BMI 18.75 kg/m²   Body mass index is 18.75 kg/m².   GENERAL: well developed, well nourished,in no apparent distress  SKIN: no rashes,no suspicious lesions. Overlying skin above L buttock with hyperpigmented lesion (birthmark)  HEENT: atraumatic, normocephalic,ears and throat are clear  EYES:PERRLA, conjunctiva are clear  NECK: supple,no adenopathy,no bruits  LUNGS: clear to auscultation  CARDIO: nl s1 and s2, RRR without murmur  GI: good BS's,no masses, HSM or tenderness  BREAST: no dominant or suspicious mass, no nipple discharge  MUSCULOSKELETAL: back is not tender,FROM of the back  EXTREMITIES: no cyanosis, clubbing or edema  NEURO: Oriented times three,cranial nerves are intact,motor and sensory are grossly intact      Labs:   Lab Results   Component Value Date/Time    WBC 6.8 05/14/2021 11:40 AM    HGB 13.5 05/14/2021 11:40 AM    .0 05/14/2021 11:40 AM      Lab Results   Component Value Date/Time    GLU 78 05/14/2021 11:40 AM     05/14/2021 11:40 AM    K 3.7 05/14/2021 11:40 AM     05/14/2021 11:40 AM    CO2 26.0 05/14/2021 11:40 AM    CREATSERUM 0.75 05/14/2021 11:40 AM    CA 9.1 05/14/2021 11:40 AM    ALB 4.1 05/14/2021 11:40 AM    TP 7.6 05/14/2021 11:40 AM    ALKPHO 50 (L) 05/14/2021 11:40 AM    AST 14 (L) 05/14/2021 11:40 AM    ALT 21 05/14/2021 11:40 AM     BILT 0.8 05/14/2021 11:40 AM    TSH 1.110 04/26/2024 12:44 PM        No results found for: \"CHOLEST\", \"HDL\", \"TRIG\", \"LDL\", \"NONHDLC\"    No results found for: \"A1C\"     Lab Results   Component Value Date    VITD 28.9 (L) 04/26/2024         Imaging:   No results found.

## 2024-08-23 LAB
BV BACTERIA DNA VAG QL NAA+PROBE: POSITIVE
C GLABRATA DNA VAG QL NAA+PROBE: NEGATIVE
C KRUSEI DNA VAG QL NAA+PROBE: NEGATIVE
C TRACH DNA SPEC QL NAA+PROBE: NEGATIVE
CANDIDA DNA VAG QL NAA+PROBE: NEGATIVE
N GONORRHOEA DNA SPEC QL NAA+PROBE: NEGATIVE
T VAGINALIS DNA VAG QL NAA+PROBE: NEGATIVE
T VAGINALIS RRNA SPEC QL NAA+PROBE: NEGATIVE

## 2024-08-23 RX ORDER — METRONIDAZOLE 500 MG/1
500 TABLET ORAL 2 TIMES DAILY
Qty: 14 TABLET | Refills: 0 | Status: SHIPPED | OUTPATIENT
Start: 2024-08-23 | End: 2024-08-30

## 2024-08-28 LAB
.: NORMAL
.: NORMAL

## 2024-11-26 ENCOUNTER — OFFICE VISIT (OUTPATIENT)
Dept: INTERNAL MEDICINE CLINIC | Facility: CLINIC | Age: 24
End: 2024-11-26
Payer: COMMERCIAL

## 2024-11-26 ENCOUNTER — LAB ENCOUNTER (OUTPATIENT)
Dept: LAB | Facility: HOSPITAL | Age: 24
End: 2024-11-26
Attending: STUDENT IN AN ORGANIZED HEALTH CARE EDUCATION/TRAINING PROGRAM
Payer: COMMERCIAL

## 2024-11-26 VITALS
WEIGHT: 111 LBS | HEART RATE: 80 BPM | DIASTOLIC BLOOD PRESSURE: 70 MMHG | RESPIRATION RATE: 20 BRPM | HEIGHT: 64.5 IN | TEMPERATURE: 99 F | BODY MASS INDEX: 18.72 KG/M2 | SYSTOLIC BLOOD PRESSURE: 116 MMHG | OXYGEN SATURATION: 99 %

## 2024-11-26 DIAGNOSIS — Z11.3 SCREENING FOR STD (SEXUALLY TRANSMITTED DISEASE): ICD-10-CM

## 2024-11-26 DIAGNOSIS — Z11.3 SCREENING FOR STD (SEXUALLY TRANSMITTED DISEASE): Primary | ICD-10-CM

## 2024-11-26 DIAGNOSIS — N89.8 VAGINAL DISCHARGE: ICD-10-CM

## 2024-11-26 LAB
HBV SURFACE AB SER QL: REACTIVE
HBV SURFACE AB SERPL IA-ACNC: 37.06 MIU/ML
HBV SURFACE AG SER-ACNC: <0.1 [IU]/L
HBV SURFACE AG SERPL QL IA: NONREACTIVE
T PALLIDUM AB SER QL IA: NONREACTIVE

## 2024-11-26 PROCEDURE — 87491 CHLMYD TRACH DNA AMP PROBE: CPT | Performed by: STUDENT IN AN ORGANIZED HEALTH CARE EDUCATION/TRAINING PROGRAM

## 2024-11-26 PROCEDURE — 3074F SYST BP LT 130 MM HG: CPT | Performed by: STUDENT IN AN ORGANIZED HEALTH CARE EDUCATION/TRAINING PROGRAM

## 2024-11-26 PROCEDURE — 3078F DIAST BP <80 MM HG: CPT | Performed by: STUDENT IN AN ORGANIZED HEALTH CARE EDUCATION/TRAINING PROGRAM

## 2024-11-26 PROCEDURE — 3008F BODY MASS INDEX DOCD: CPT | Performed by: STUDENT IN AN ORGANIZED HEALTH CARE EDUCATION/TRAINING PROGRAM

## 2024-11-26 PROCEDURE — 99213 OFFICE O/P EST LOW 20 MIN: CPT | Performed by: STUDENT IN AN ORGANIZED HEALTH CARE EDUCATION/TRAINING PROGRAM

## 2024-11-26 PROCEDURE — 87389 HIV-1 AG W/HIV-1&-2 AB AG IA: CPT

## 2024-11-26 PROCEDURE — 81514 NFCT DS BV&VAGINITIS DNA ALG: CPT | Performed by: STUDENT IN AN ORGANIZED HEALTH CARE EDUCATION/TRAINING PROGRAM

## 2024-11-26 PROCEDURE — 36415 COLL VENOUS BLD VENIPUNCTURE: CPT

## 2024-11-26 PROCEDURE — 87340 HEPATITIS B SURFACE AG IA: CPT

## 2024-11-26 PROCEDURE — 87591 N.GONORRHOEAE DNA AMP PROB: CPT | Performed by: STUDENT IN AN ORGANIZED HEALTH CARE EDUCATION/TRAINING PROGRAM

## 2024-11-26 PROCEDURE — 86780 TREPONEMA PALLIDUM: CPT

## 2024-11-26 PROCEDURE — 86706 HEP B SURFACE ANTIBODY: CPT

## 2024-11-26 NOTE — PROGRESS NOTES
CHIEF COMPLAINT:   Chief Complaint   Patient presents with    Sexually Transmitted     Want full panel for STD       HPI , Assessment & Plan:   Brooklynn Zheng is a 24 year old female who presents for STD check.     Wt Readings from Last 6 Encounters:   11/26/24 111 lb (50.3 kg)   08/22/24 112 lb (50.8 kg)   05/02/24 115 lb (52.2 kg)   04/26/24 119 lb 0.8 oz (54 kg)   06/13/23 115 lb 0.4 oz (52.2 kg)   01/28/22 105 lb (47.6 kg)     Body mass index is 18.76 kg/m².     //STD check  Denies vaginal itching, burning with urination, blood in the urine. Does feel like sometimes her vaginal discharge feels green in color. November 10th was the last period. Denies any new sexual partners.   PLAN:   -HIV, Hepatitis B, hepatitis C, and syphillis labs ordered.   -Chlamydia, gonorrhea, vaginitis, and trichimonas vaginal panel pending.     PROBLEMS NOT DISCUSSED TODAY:   //AUB  Menstrual cycle came 11 days early. Has a new partner. Wants to be tested for STDs. Went to Urgent Care recently, chlamydia and gonorrhea was tested but inconclusive. Pregnancy testing was negative. Reporting high levels of stress this month. Reporting food poisoning x3 times. Quit smoking on Sunday. Drinking a lot more than usual. Has a very stressful job. August 1-4th period, August 18th-Today also has a period. Quit smoking on Sunday. She is reporting she is drinking alcohol every other day. A couple glasses of wine. Reports 10 shots over the weekend.   PLAN:   Her early period is likely a manifestation of stress and lifestyle. This is her first early period. Recommend major lifestyle changes at this time and to reevaluate in 3 months.   -Recommend continued monitoring of periods. If continues to come early, then would recommend evaluation by gyn and potentially vaginal ultrasound.   -Recommend lifestyle change by cutting out alcohol completely, continue to quit smoking, and exercise and dietary changes.   -Recommend reducing stressors as able.      //Anxiety   18yo was started on medications by psychiatrist - aderral, anxiety medicatons, sleeping medicaton, mood stabilizer. Does not feel that she needed all this. Has seen a psychologist and confirmed that she does not have ADHD. Now only seeing therapist every 2 weeks and feels like her symptoms are well controlled. Mother with extensive mental health history and passed away from suicide. Patient reports that after her mother passed away, she has had a lot more stability in her life and sees her step mother in a maternal figure. Also reports that with the help of her dad and step mom, she has had a good experience with recovery. She reports that right now she is dealing with some situational anxiety with her current boyfriend as he has moved away and she does not want to. Some of these thoughts make it difficult for her to sleep at night.   PLAN:   PHQ9 - 20. SI screening negative. Continue therapy. Continue to monitor. She feels her mental health is situational and she feels that the current stressors will be removed soon and she will feel better. I did bring up medication with her to consider if symptoms progress. We did not discuss a specific medication or side effects.   MedHx: adderall    //Lip fillers, dissolved   Recently dissolved. Concerned that she has retention. Does not feel any right now, but feels like her lower lip might be a bit lopsided.   PLAN:   Refer to cosmetic physician. Per discussion with radiology, unclear what imaging would be best to evaluate for her concern of retained lip filler.     //Benign rolandic epilepsy   No longer having symptoms. Took Keppra for 3 years. Stopped taking the medications at 13yo. Last seizure likely 2011 prior to starting keppra. In total reporting 50-70 episodes starting at 7 years old.   PLAN:   No longer having symptom. Does not see neurologist. CTM.     //Vitamin D deficiency   Prescribed [id number] weekly x 8.   PLAN:   Recommend OTC vitamin D 5000IU  daily. Recheck in 8 weeks. If vitamin D deficiency not resolved at that time, will recommend high dose vitamin D.     //Hx of iron deficiency anemia  PLAN:   Ferritin wnls. CBC ordered.     //Former smoker  No longer smoking or vaping.   PLAN:   Continue to monitor.     //Alopecia Aerata  Follows with dermatology, Dr. Rice. Reports it has resolved now.   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    HEALTH MAINTENANCE:   -Vaccinations: Flu not in season, COVID DUe, HPV vaccine due, TdAP due  -Colonoscopy: - Not indicated  -Mammogram: - Not indicated  -Pap Smear: 08/22/2024 was inconclusive in April, repeat testing done today.   -Diabetes screening: Last A1c value was  % done  .  -Lipid screening: No Lipid panel on file.   -Birth Control: None  -Smoking: Vaping to smoking Former 4 years, jewel pods  -Alcohol: Rarely   -Marijuana: None  -Recreational drug: None    No follow-ups on file.    Rox Lubin MD   Internal Medicine     Current Outpatient Medications   Medication Sig Dispense Refill    vitamin B-12 50 MCG Oral Tab Take 1 tablet (50 mcg total) by mouth daily.      ASHWAGANDHA 35 OR Take by mouth.      IRON-FOLIC ACID OR Take 1 tablet by mouth daily. Take 1/2 a tablet daily        Past Medical History:    Anemia    Anxiety    Benign rolandic epilepsy (HCC)    Depression    Dysthymia    Normocytic anemia    Seizure disorder (HCC)    pt taking keppra    Vitamin D deficiency      Past Surgical History:   Procedure Laterality Date    Adenoidectomy Bilateral 2007    Botox Bilateral 10/2020    Lip injections    Other surgical history      adnoids out 2007      Family History   Problem Relation Age of Onset    Depression Mother     Anxiety Mother     PTSD Mother     Bipolar Disorder Mother     Suicide History Mother     High Blood Pressure Father     High Cholesterol Father     Alcohol and Other Disorders Associated Father      Hypertension Father     Other (throat cancer) Father     Heart Attack Father         s/p stent    Other (autism) Brother     Learning Disability Brother         Aspergers    Depression Brother     Alcohol and Other Disorders Associated Brother     Arthritis Maternal Grandmother     High Blood Pressure Maternal Grandmother     Other (Other) Maternal Grandmother     Arthritis Maternal Grandfather     Heart Disease Maternal Grandfather     High Blood Pressure Maternal Grandfather     Kidney Disease Maternal Grandfather     Cancer Maternal Grandfather     Heart Attack Maternal Grandfather       Social History:   Social History     Socioeconomic History    Marital status: Single   Tobacco Use    Smoking status: Former     Types: Cigarettes     Passive exposure: Past    Smokeless tobacco: Never    Tobacco comments:     2 cigs per wk   Vaping Use    Vaping status: Never Used   Substance and Sexual Activity    Alcohol use: No    Drug use: No   Other Topics Concern    Caffeine Concern Yes    Exercise Yes    Seat Belt Yes    Special Diet No    Stress Concern Yes    Weight Concern No   Social History Narrative    Works 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     Occ: Business. : No. Children: No.        REVIEW OF SYSTEMS:   Negative except for what is mentioned in HPI.     Screenings:   1.    2.    3.    4.    5.    6.    7.    8.    9.             EXAM:   /70 (BP Location: Right arm, Patient Position: Sitting, Cuff Size: adult)   Pulse 80   Temp 99.2 °F (37.3 °C) (Temporal)   Resp 20   Ht 5' 4.5\" (1.638 m)   Wt 111 lb (50.3 kg)   LMP 04/12/2024 (Exact Date)   SpO2 99%   BMI 18.76 kg/m²   Body mass index is 18.76 kg/m².   GENERAL: well developed, well nourished,in no apparent distress  SKIN: no rashes,no suspicious lesions. Overlying skin above L buttock with hyperpigmented lesion (birthmark)  HEENT: atraumatic, normocephalic,ears  and throat are clear  EYES:PERRLA, conjunctiva are clear  NECK: supple,no adenopathy,no bruits  LUNGS: clear to auscultation  CARDIO: nl s1 and s2, RRR without murmur  GI: good BS's,no masses, HSM or tenderness  BREAST: no dominant or suspicious mass, no nipple discharge  MUSCULOSKELETAL: back is not tender,FROM of the back  EXTREMITIES: no cyanosis, clubbing or edema  NEURO: Oriented times three,cranial nerves are intact,motor and sensory are grossly intact      Labs:   Lab Results   Component Value Date/Time    WBC 6.8 05/14/2021 11:40 AM    HGB 13.5 05/14/2021 11:40 AM    .0 05/14/2021 11:40 AM      Lab Results   Component Value Date/Time    GLU 78 05/14/2021 11:40 AM     05/14/2021 11:40 AM    K 3.7 05/14/2021 11:40 AM     05/14/2021 11:40 AM    CO2 26.0 05/14/2021 11:40 AM    CREATSERUM 0.75 05/14/2021 11:40 AM    CA 9.1 05/14/2021 11:40 AM    ALB 4.1 05/14/2021 11:40 AM    TP 7.6 05/14/2021 11:40 AM    ALKPHO 50 (L) 05/14/2021 11:40 AM    AST 14 (L) 05/14/2021 11:40 AM    ALT 21 05/14/2021 11:40 AM    BILT 0.8 05/14/2021 11:40 AM    TSH 1.110 04/26/2024 12:44 PM        No results found for: \"CHOLEST\", \"HDL\", \"TRIG\", \"LDL\", \"NONHDLC\"    No results found for: \"A1C\"     Lab Results   Component Value Date    VITD 28.9 (L) 04/26/2024         Imaging:   No results found.

## 2024-11-27 LAB
BV BACTERIA DNA VAG QL NAA+PROBE: NEGATIVE
C GLABRATA DNA VAG QL NAA+PROBE: NEGATIVE
C KRUSEI DNA VAG QL NAA+PROBE: NEGATIVE
C TRACH DNA SPEC QL NAA+PROBE: NEGATIVE
CANDIDA DNA VAG QL NAA+PROBE: NEGATIVE
N GONORRHOEA DNA SPEC QL NAA+PROBE: NEGATIVE
T VAGINALIS DNA VAG QL NAA+PROBE: NEGATIVE

## 2024-12-16 ENCOUNTER — TELEMEDICINE (OUTPATIENT)
Dept: INTERNAL MEDICINE CLINIC | Facility: CLINIC | Age: 24
End: 2024-12-16
Payer: COMMERCIAL

## 2024-12-16 DIAGNOSIS — R53.83 OTHER FATIGUE: Primary | Chronic | ICD-10-CM

## 2024-12-16 DIAGNOSIS — Z13.0 SCREENING FOR DEFICIENCY ANEMIA: ICD-10-CM

## 2024-12-16 DIAGNOSIS — Z13.29 SCREENING FOR ENDOCRINE, METABOLIC AND IMMUNITY DISORDER: ICD-10-CM

## 2024-12-16 DIAGNOSIS — F51.05 INSOMNIA DUE TO OTHER MENTAL DISORDER: ICD-10-CM

## 2024-12-16 DIAGNOSIS — Z13.228 SCREENING FOR ENDOCRINE, METABOLIC AND IMMUNITY DISORDER: ICD-10-CM

## 2024-12-16 DIAGNOSIS — Z13.220 SCREENING FOR LIPID DISORDERS: ICD-10-CM

## 2024-12-16 DIAGNOSIS — E55.9 VITAMIN D DEFICIENCY: ICD-10-CM

## 2024-12-16 DIAGNOSIS — R40.0 DAYTIME SOMNOLENCE: ICD-10-CM

## 2024-12-16 DIAGNOSIS — F99 INSOMNIA DUE TO OTHER MENTAL DISORDER: ICD-10-CM

## 2024-12-16 DIAGNOSIS — Z00.00 PHYSICAL EXAM: ICD-10-CM

## 2024-12-16 DIAGNOSIS — Z13.0 SCREENING FOR ENDOCRINE, METABOLIC AND IMMUNITY DISORDER: ICD-10-CM

## 2024-12-16 NOTE — PATIENT INSTRUCTIONS
Call insurance before scheduling sleep study.     Get labs done fasting. No food for 8 hours.     Consider medication management of anxiety and insomnia - Mirtazapine. Medication information attached.     Mindfullness rupa: Headspace     Sleep hygiene attached.

## 2025-05-06 ENCOUNTER — PATIENT MESSAGE (OUTPATIENT)
Dept: INTERNAL MEDICINE CLINIC | Facility: CLINIC | Age: 25
End: 2025-05-06

## 2025-05-28 ENCOUNTER — OFFICE VISIT (OUTPATIENT)
Dept: SLEEP CENTER | Age: 25
End: 2025-05-28
Attending: Other
Payer: COMMERCIAL

## 2025-05-28 DIAGNOSIS — Z00.00 PHYSICAL EXAM: ICD-10-CM

## 2025-05-28 DIAGNOSIS — F51.05 INSOMNIA DUE TO OTHER MENTAL DISORDER: ICD-10-CM

## 2025-05-28 DIAGNOSIS — F99 INSOMNIA DUE TO OTHER MENTAL DISORDER: ICD-10-CM

## 2025-05-28 DIAGNOSIS — R40.0 DAYTIME SOMNOLENCE: ICD-10-CM

## 2025-05-28 PROCEDURE — 95806 SLEEP STUDY UNATT&RESP EFFT: CPT

## 2025-06-05 ENCOUNTER — OFFICE VISIT (OUTPATIENT)
Dept: INTERNAL MEDICINE CLINIC | Facility: CLINIC | Age: 25
End: 2025-06-05
Payer: COMMERCIAL

## 2025-06-05 ENCOUNTER — LAB ENCOUNTER (OUTPATIENT)
Dept: LAB | Age: 25
End: 2025-06-05
Attending: STUDENT IN AN ORGANIZED HEALTH CARE EDUCATION/TRAINING PROGRAM
Payer: COMMERCIAL

## 2025-06-05 VITALS
HEIGHT: 64.5 IN | DIASTOLIC BLOOD PRESSURE: 60 MMHG | WEIGHT: 120 LBS | SYSTOLIC BLOOD PRESSURE: 102 MMHG | TEMPERATURE: 99 F | OXYGEN SATURATION: 100 % | HEART RATE: 73 BPM | BODY MASS INDEX: 20.24 KG/M2 | RESPIRATION RATE: 20 BRPM

## 2025-06-05 DIAGNOSIS — E55.9 VITAMIN D DEFICIENCY: ICD-10-CM

## 2025-06-05 DIAGNOSIS — Z13.29 SCREENING FOR THYROID DISORDER: ICD-10-CM

## 2025-06-05 DIAGNOSIS — Z13.0 SCREENING FOR ENDOCRINE, METABOLIC AND IMMUNITY DISORDER: ICD-10-CM

## 2025-06-05 DIAGNOSIS — G40.009 BENIGN ROLANDIC EPILEPSY (HCC): ICD-10-CM

## 2025-06-05 DIAGNOSIS — F41.9 ANXIETY: ICD-10-CM

## 2025-06-05 DIAGNOSIS — Z13.29 SCREENING FOR ENDOCRINE DISORDER: ICD-10-CM

## 2025-06-05 DIAGNOSIS — Z13.220 SCREENING FOR LIPID DISORDERS: ICD-10-CM

## 2025-06-05 DIAGNOSIS — F99 INSOMNIA DUE TO OTHER MENTAL DISORDER: ICD-10-CM

## 2025-06-05 DIAGNOSIS — Z86.2 HISTORY OF IRON DEFICIENCY ANEMIA: ICD-10-CM

## 2025-06-05 DIAGNOSIS — F51.05 INSOMNIA DUE TO OTHER MENTAL DISORDER: ICD-10-CM

## 2025-06-05 DIAGNOSIS — Z00.00 PHYSICAL EXAM: ICD-10-CM

## 2025-06-05 DIAGNOSIS — Z13.0 SCREENING FOR DEFICIENCY ANEMIA: ICD-10-CM

## 2025-06-05 DIAGNOSIS — Z13.228 SCREENING FOR ENDOCRINE, METABOLIC AND IMMUNITY DISORDER: ICD-10-CM

## 2025-06-05 DIAGNOSIS — Z13.29 SCREENING FOR ENDOCRINE, METABOLIC AND IMMUNITY DISORDER: ICD-10-CM

## 2025-06-05 DIAGNOSIS — R53.82 CHRONIC FATIGUE: ICD-10-CM

## 2025-06-05 DIAGNOSIS — Z00.00 PHYSICAL EXAM: Primary | ICD-10-CM

## 2025-06-05 DIAGNOSIS — Z13.1 SCREENING FOR DIABETES MELLITUS: ICD-10-CM

## 2025-06-05 DIAGNOSIS — R53.83 OTHER FATIGUE: Chronic | ICD-10-CM

## 2025-06-05 PROBLEM — L63.9 ALOPECIA AREATA: Status: RESOLVED | Noted: 2024-05-02 | Resolved: 2025-06-05

## 2025-06-05 LAB
ALBUMIN SERPL-MCNC: 4.8 G/DL (ref 3.2–4.8)
ALBUMIN/GLOB SERPL: 1.9 {RATIO} (ref 1–2)
ALP LIVER SERPL-CCNC: 46 U/L (ref 37–98)
ALT SERPL-CCNC: 22 U/L (ref 10–49)
ANION GAP SERPL CALC-SCNC: 7 MMOL/L (ref 0–18)
AST SERPL-CCNC: 24 U/L (ref ?–34)
BASOPHILS # BLD AUTO: 0.03 X10(3) UL (ref 0–0.2)
BASOPHILS NFR BLD AUTO: 0.7 %
BILIRUB SERPL-MCNC: 0.5 MG/DL (ref 0.3–1.2)
BUN BLD-MCNC: 15 MG/DL (ref 9–23)
CALCIUM BLD-MCNC: 9.5 MG/DL (ref 8.7–10.6)
CHLORIDE SERPL-SCNC: 104 MMOL/L (ref 98–112)
CHOLEST SERPL-MCNC: 163 MG/DL (ref ?–200)
CO2 SERPL-SCNC: 27 MMOL/L (ref 21–32)
CREAT BLD-MCNC: 0.76 MG/DL (ref 0.55–1.02)
DEPRECATED HBV CORE AB SER IA-ACNC: 51 NG/ML (ref 50–306)
EGFRCR SERPLBLD CKD-EPI 2021: 112 ML/MIN/1.73M2 (ref 60–?)
EOSINOPHIL # BLD AUTO: 0.1 X10(3) UL (ref 0–0.7)
EOSINOPHIL NFR BLD AUTO: 2.2 %
ERYTHROCYTE [DISTWIDTH] IN BLOOD BY AUTOMATED COUNT: 11.8 %
EST. AVERAGE GLUCOSE BLD GHB EST-MCNC: 100 MG/DL (ref 68–126)
FASTING PATIENT LIPID ANSWER: YES
FASTING STATUS PATIENT QL REPORTED: YES
GLOBULIN PLAS-MCNC: 2.5 G/DL (ref 2–3.5)
GLUCOSE BLD-MCNC: 85 MG/DL (ref 70–99)
HBA1C MFR BLD: 5.1 % (ref ?–5.7)
HCT VFR BLD AUTO: 39.8 % (ref 35–48)
HDLC SERPL-MCNC: 82 MG/DL (ref 40–59)
HGB BLD-MCNC: 13.2 G/DL (ref 12–16)
IMM GRANULOCYTES # BLD AUTO: 0 X10(3) UL (ref 0–1)
IMM GRANULOCYTES NFR BLD: 0 %
IRON SATN MFR SERPL: 38 % (ref 15–50)
IRON SERPL-MCNC: 126 UG/DL (ref 50–170)
LDLC SERPL CALC-MCNC: 70 MG/DL (ref ?–100)
LYMPHOCYTES # BLD AUTO: 1.49 X10(3) UL (ref 1–4)
LYMPHOCYTES NFR BLD AUTO: 32.7 %
MCH RBC QN AUTO: 31.2 PG (ref 26–34)
MCHC RBC AUTO-ENTMCNC: 33.2 G/DL (ref 31–37)
MCV RBC AUTO: 94.1 FL (ref 80–100)
MONOCYTES # BLD AUTO: 0.47 X10(3) UL (ref 0.1–1)
MONOCYTES NFR BLD AUTO: 10.3 %
NEUTROPHILS # BLD AUTO: 2.46 X10 (3) UL (ref 1.5–7.7)
NEUTROPHILS # BLD AUTO: 2.46 X10(3) UL (ref 1.5–7.7)
NEUTROPHILS NFR BLD AUTO: 54.1 %
NONHDLC SERPL-MCNC: 81 MG/DL (ref ?–130)
OSMOLALITY SERPL CALC.SUM OF ELEC: 286 MOSM/KG (ref 275–295)
PLATELET # BLD AUTO: 246 10(3)UL (ref 150–450)
POTASSIUM SERPL-SCNC: 3.6 MMOL/L (ref 3.5–5.1)
PROT SERPL-MCNC: 7.3 G/DL (ref 5.7–8.2)
RBC # BLD AUTO: 4.23 X10(6)UL (ref 3.8–5.3)
SODIUM SERPL-SCNC: 138 MMOL/L (ref 136–145)
TOTAL IRON BINDING CAPACITY: 328 UG/DL (ref 250–425)
TRANSFERRIN SERPL-MCNC: 257 MG/DL (ref 250–380)
TRIGL SERPL-MCNC: 52 MG/DL (ref 30–149)
TSI SER-ACNC: 0.97 UIU/ML (ref 0.55–4.78)
VIT B12 SERPL-MCNC: 860 PG/ML (ref 211–911)
VIT D+METAB SERPL-MCNC: 41.6 NG/ML (ref 30–100)
VLDLC SERPL CALC-MCNC: 8 MG/DL (ref 0–30)
WBC # BLD AUTO: 4.6 X10(3) UL (ref 4–11)

## 2025-06-05 PROCEDURE — 80050 GENERAL HEALTH PANEL: CPT | Performed by: STUDENT IN AN ORGANIZED HEALTH CARE EDUCATION/TRAINING PROGRAM

## 2025-06-05 PROCEDURE — 82306 VITAMIN D 25 HYDROXY: CPT | Performed by: STUDENT IN AN ORGANIZED HEALTH CARE EDUCATION/TRAINING PROGRAM

## 2025-06-05 PROCEDURE — 83550 IRON BINDING TEST: CPT | Performed by: STUDENT IN AN ORGANIZED HEALTH CARE EDUCATION/TRAINING PROGRAM

## 2025-06-05 PROCEDURE — 83540 ASSAY OF IRON: CPT | Performed by: STUDENT IN AN ORGANIZED HEALTH CARE EDUCATION/TRAINING PROGRAM

## 2025-06-05 PROCEDURE — 83036 HEMOGLOBIN GLYCOSYLATED A1C: CPT | Performed by: STUDENT IN AN ORGANIZED HEALTH CARE EDUCATION/TRAINING PROGRAM

## 2025-06-05 PROCEDURE — 82728 ASSAY OF FERRITIN: CPT | Performed by: STUDENT IN AN ORGANIZED HEALTH CARE EDUCATION/TRAINING PROGRAM

## 2025-06-05 PROCEDURE — 82607 VITAMIN B-12: CPT | Performed by: STUDENT IN AN ORGANIZED HEALTH CARE EDUCATION/TRAINING PROGRAM

## 2025-06-05 PROCEDURE — 80061 LIPID PANEL: CPT | Performed by: STUDENT IN AN ORGANIZED HEALTH CARE EDUCATION/TRAINING PROGRAM

## 2025-06-05 NOTE — PROGRESS NOTES
CHIEF COMPLAINT:   Chief Complaint   Patient presents with    Routine Physical     4/26/24 Pap 3 years repeat.     Follow - Up     Sleep Study results  5/28/25       HPI:   Brooklynn Zheng is a 24 year old female who presents for a physical exam.     Wt Readings from Last 6 Encounters:   06/05/25 120 lb (54.4 kg)   11/26/24 111 lb (50.3 kg)   08/22/24 112 lb (50.8 kg)   05/02/24 115 lb (52.2 kg)   04/26/24 119 lb 0.8 oz (54 kg)   06/13/23 115 lb 0.4 oz (52.2 kg)     Body mass index is 20.28 kg/m².     History of Present Illness  Brooklynn Zheng is a 24 year old female who presents with persistent fatigue and hypersomnia.    She experiences persistent fatigue and hypersomnia despite a home sleep study showing no evidence of sleep apnea. She feels exhausted upon waking, has periorbital puffiness, and constant fatigue throughout the day. She does not take naps and does not experience sudden sleep episodes during the day.    She has a history of anxiety and depression, which she describes as being exacerbated recently. She feels significantly stressed and has been discussing her stressors with her therapist. She has tried medications for anxiety and depression in the past but is currently not on any psychiatric medications.    Significant stress related to family dynamics, particularly with her father and brother, has led to her changing jobs. She previously worked for her father but left due to a stressful environment and now works at [employer]. She describes her family as 'explosive' and 'verbally aggressive', contributing to her stress levels.    She has a history of vitamin D deficiency and iron deficiency anemia. She is currently eating well and getting natural vitamin D. Her menstrual cycles have been irregular, with some cycles causing significant physical symptoms like swelling and water retention, while others cause more mental symptoms and hormonal acne. Her periods are now more predictable and  last about five days, though she reports variability in symptoms from cycle to cycle.    She has a past medical history of epilepsy with no recent episodes and is not on any medications for it. She has quit smoking since March and has not used marijuana since October. She reports gaining 20 pounds since quitting smoking, which she attributes to improved eating habits and cessation of nicotine, an appetite suppressant.    She has been experiencing hormonal acne, which she attributes to changes in her weight and smoking cessation. She has been getting monthly facials and using a new face wash and exfoliating routine, which she reports has improved her skin condition.       Current Medications[1]   Past Medical History[2]   Past Surgical History[3]   Family History[4]   Social History:   Short Social Hx on File[5]  Occ: retail. : in a relationship. Children: none.   Exercise: walking.  Diet: watches minimally     REVIEW OF SYSTEMS:   Negative except for what is mentioned in HPI.     Screenings:   1. Little interest or pleasure in doing things: Not at all  2. Feeling down, depressed, or hopeless: Not at all  3.    4.    5.    6.    7.    8.    9.             EXAM:   /60 (BP Location: Right arm, Patient Position: Sitting, Cuff Size: large)   Pulse 73   Temp 98.6 °F (37 °C) (Temporal)   Resp 20   Ht 5' 4.5\" (1.638 m)   Wt 120 lb (54.4 kg)   LMP 06/02/2025   SpO2 100%   BMI 20.28 kg/m²   Body mass index is 20.28 kg/m².   GENERAL: well developed, well nourished,in no apparent distress  SKIN: no rashes,no suspicious lesions  HEENT: atraumatic, normocephalic,ears and throat are clear  EYES:PERRLA, conjunctiva are clear  NECK: supple,no adenopathy,no bruits  LUNGS: clear to auscultation  CARDIO: nl s1 and s2, RRR without murmur  GI: no masses, HSM or tenderness  BREAST: deferred  GENITAL/URINARY: Deferred  EXTREMITIES: no cyanosis, clubbing or edema  NEURO: Oriented times three,cranial nerves are  intact,motor and sensory are grossly intact    Physical Exam      Labs:   Lab Results   Component Value Date/Time    WBC 6.8 05/14/2021 11:40 AM    HGB 13.5 05/14/2021 11:40 AM    .0 05/14/2021 11:40 AM      Lab Results   Component Value Date/Time    GLU 78 05/14/2021 11:40 AM     05/14/2021 11:40 AM    K 3.7 05/14/2021 11:40 AM     05/14/2021 11:40 AM    CO2 26.0 05/14/2021 11:40 AM    CREATSERUM 0.75 05/14/2021 11:40 AM    CA 9.1 05/14/2021 11:40 AM    ALB 4.1 05/14/2021 11:40 AM    TP 7.6 05/14/2021 11:40 AM    ALKPHO 50 (L) 05/14/2021 11:40 AM    AST 14 (L) 05/14/2021 11:40 AM    ALT 21 05/14/2021 11:40 AM    BILT 0.8 05/14/2021 11:40 AM    TSH 1.110 04/26/2024 12:44 PM        No results found for: \"CHOLEST\", \"HDL\", \"TRIG\", \"LDL\", \"NONHDLC\"    No results found for: \"A1C\"   Lab Results   Component Value Date    VITD 28.9 (L) 04/26/2024         Imaging:   No results found.    Assessment & Plan  Hypersomnia  The home sleep study did not indicate sleep apnea, which is reassuring. However, hypersomnia persists as she reports feeling exhausted upon waking. Consideration is given to REM sleep behavior disorder or other sleep-related disorders. Anxiety and stress may contribute to non-restorative sleep. Referral to a sleep medicine specialist is recommended to evaluate medication treatments for hypersomnia, considering potential side effects and benefits. An inpatient sleep study may be considered if further evaluation is needed, although the home study results suggest a low likelihood of sleep apnea.  - Refer to sleep medicine specialist for further evaluation and potential treatment options for hypersomnia.  - Discuss potential for inpatient sleep study if further evaluation is needed.    Anxiety  Increased stress and anxiety may contribute to sleep disturbances. She is undergoing therapy, and positive lifestyle changes, including quitting smoking and reducing stressors, have improved her mental  health. A psychiatric evaluation is considered if therapy alone is insufficient, with medication not being the immediate next step unless recommended by her therapist.  - Continue therapy sessions.  - Consider psychiatric evaluation for further assessment and potential treatment options if therapy alone is insufficient.    Abnormal Uterine Bleeding (AUB)  She reports variability in menstrual symptoms, with some cycles presenting physical symptoms and others with mental symptoms. The current cycle is slightly heavy but within a normal duration. Previous heavy bleeding episodes have improved with lifestyle changes.  - Reassess if symptoms worsen or if there are significant changes in menstrual patterns.    General Health Maintenance  She has made positive lifestyle changes, including quitting smoking and reducing alcohol and marijuana use. She engages in regular physical activity. The benefits of the HPV vaccine for cervical cancer prevention were discussed, emphasizing its role in preventing infection rather than treating it.  - Encourage continued healthy lifestyle choices, including regular exercise and balanced nutrition.  - Discuss the benefits of the HPV vaccine for cervical cancer prevention.    Follow-up  Follow-up is required for further evaluation of hypersomnia and potential psychiatric evaluation. Lab tests are needed to assess for deficiencies that may contribute to fatigue.  - Order lab tests to recheck vitamin D, iron, B12, thyroid function, A1c, cholesterol, liver function, and anemia panel.  - Follow up with sleep medicine specialist referral.  - Consider psychiatric evaluation if recommended by therapist.    Recording duration: 24 minutes    //Benign rolandic epilepsy   No longer having symptoms. Took Keppra for 3 years. Stopped taking the medications at 13yo. Last seizure likely 2011 prior to starting keppra. In total reporting 50-70 episodes starting at 7 years old.   -No longer having symptom.  Does not see neurologist. CTM.     //Vitamin D deficiency   -recheck ordered    //Hx of iron deficiency anemia  PLAN:   -labs ordered    //Former smoker  No longer smoking or vaping.   PLAN:   Continue to monitor.     HEALTH MAINTENANCE:   -Vaccinations: Flu not in season, COVID refused, HPV refused, TdAP refused  -Colonoscopy: - Not indicated  -Mammogram: - Not indicated  -Pap Smear: 08/22/2024   -Diabetes screening: Last A1c value was  % done  .  -Lipid screening: No Lipid panel on file.   -Birth Control: None  -Smoking: Stopped smoking  -Alcohol: Rarely   -Marijuana: None  -Recreational drug: None    Return in about 1 year (around 6/5/2026) for physical exam .    Rox Lubin MD   Internal Medicine            The following individual(s) verbally consented to be recorded using ambient AI listening technology and understand that they can each withdraw their consent to this listening technology at any point by asking the clinician to turn off or pause the recording:    Patient name: Brooklynn Zheng             [1]   Current Outpatient Medications   Medication Sig Dispense Refill    vitamin B-12 50 MCG Oral Tab Take 1 tablet (50 mcg total) by mouth daily.      ASHWAGANDHA 35 OR Take by mouth.      IRON-FOLIC ACID OR Take 1 tablet by mouth daily. Take 1/2 a tablet daily     [2]   Past Medical History:   Anemia    Anxiety    Benign rolandic epilepsy (HCC)    Depression    Dysthymia    Normocytic anemia    Seizure disorder (HCC)    pt taking keppra    Vitamin D deficiency   [3]   Past Surgical History:  Procedure Laterality Date    Adenoidectomy Bilateral 2007    Botox Bilateral 10/2020    Lip injections    Other surgical history      adnoids out 2007   [4]   Family History  Problem Relation Age of Onset    Depression Mother     Anxiety Mother     PTSD Mother     Bipolar Disorder Mother     Suicide History Mother     High Blood Pressure Father     High Cholesterol Father     Alcohol and Other Disorders Associated  Father     Hypertension Father     Other (throat cancer) Father     Heart Attack Father         s/p stent    Other (autism) Brother     Learning Disability Brother         Aspergers    Depression Brother     Alcohol and Other Disorders Associated Brother     Arthritis Maternal Grandmother     High Blood Pressure Maternal Grandmother     Other (Other) Maternal Grandmother     Arthritis Maternal Grandfather     Heart Disease Maternal Grandfather     High Blood Pressure Maternal Grandfather     Kidney Disease Maternal Grandfather     Cancer Maternal Grandfather     Heart Attack Maternal Grandfather    [5]   Social History  Socioeconomic History    Marital status: Single   Tobacco Use    Smoking status: Former     Types: Cigarettes     Passive exposure: Past    Smokeless tobacco: Never    Tobacco comments:     2 cigs per wk   Vaping Use    Vaping status: Never Used   Substance and Sexual Activity    Alcohol use: No    Drug use: No   Other Topics Concern    Caffeine Concern Yes    Exercise Yes    Seat Belt Yes    Special Diet No    Stress Concern Yes    Weight Concern No   Social History Narrative    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

## 2025-06-05 NOTE — PATIENT INSTRUCTIONS
VISIT SUMMARY:  During your visit, we discussed your persistent fatigue and hypersomnia, as well as your history of anxiety, depression, and irregular menstrual cycles. We also reviewed your recent lifestyle changes, including quitting smoking and improving your diet. Your home sleep study showed no evidence of sleep apnea, but we are considering other potential causes for your symptoms.    YOUR PLAN:  -HYPERSOMNIA: Hypersomnia means excessive sleepiness. Since your home sleep study did not show sleep apnea, we will refer you to a sleep medicine specialist to explore other possible causes and treatments. An inpatient sleep study may be considered if needed.    -ANXIETY: Anxiety can contribute to sleep disturbances and overall fatigue. You are currently in therapy, which is beneficial. If therapy alone is not enough, we may consider a psychiatric evaluation for further assessment and potential treatment options.    -ABNORMAL UTERINE BLEEDING (AUB): Abnormal Uterine Bleeding refers to irregular menstrual cycles with varying symptoms. Your current cycle is slightly heavy but within a normal range. We will reassess if your symptoms worsen or if there are significant changes.    -GENERAL HEALTH MAINTENANCE: You have made positive lifestyle changes, including quitting smoking and reducing alcohol and marijuana use. Regular exercise and balanced nutrition are encouraged. We also discussed the benefits of the HPV vaccine for preventing cervical cancer.    INSTRUCTIONS:  Please follow up with the sleep medicine specialist for further evaluation of your hypersomnia. We will also order lab tests to check your vitamin D, iron, B12, thyroid function, A1c, cholesterol, liver function, and anemia panel. If your therapist recommends it, consider a psychiatric evaluation for your anxiety.    Contains text generated by Renate

## 2025-06-09 ENCOUNTER — SLEEP STUDY (OUTPATIENT)
Facility: CLINIC | Age: 25
End: 2025-06-09
Payer: COMMERCIAL

## 2025-06-09 DIAGNOSIS — G47.9 SLEEP DISORDER: Primary | ICD-10-CM

## 2025-06-09 PROCEDURE — 95806 SLEEP STUDY UNATT&RESP EFFT: CPT | Performed by: OTHER

## (undated) NOTE — LETTER
05/03/21        Mirlande Zheng  [address] [facility]      Dear Jonah Mendez,    Just a friendly reminder, our records indicate that you have outstanding lab work and or testing that was ordered for you and has not yet been completed:        SANTOS W